# Patient Record
Sex: FEMALE | Race: OTHER | NOT HISPANIC OR LATINO
[De-identification: names, ages, dates, MRNs, and addresses within clinical notes are randomized per-mention and may not be internally consistent; named-entity substitution may affect disease eponyms.]

---

## 2022-02-14 ENCOUNTER — TRANSCRIPTION ENCOUNTER (OUTPATIENT)
Age: 52
End: 2022-02-14

## 2022-02-14 VITALS
SYSTOLIC BLOOD PRESSURE: 115 MMHG | RESPIRATION RATE: 16 BRPM | TEMPERATURE: 97 F | HEIGHT: 64 IN | DIASTOLIC BLOOD PRESSURE: 52 MMHG | HEART RATE: 71 BPM | WEIGHT: 150.58 LBS | OXYGEN SATURATION: 100 %

## 2022-02-14 NOTE — ASU PATIENT PROFILE, ADULT - FALL HARM RISK - HARM RISK INTERVENTIONS

## 2022-02-14 NOTE — ASU PATIENT PROFILE, ADULT - NSICDXPASTMEDICALHX_GEN_ALL_CORE_FT
PAST MEDICAL HISTORY:  Anxiety     Asthma resolved    Fibroid 2020    Food allergy     Hemangioma of liver     Kidney stone 2006     PAST MEDICAL HISTORY:  Anxiety     Asthma resolved    Fibroid 2020    Hemangioma of liver     Kidney stone 2006

## 2022-02-15 ENCOUNTER — RESULT REVIEW (OUTPATIENT)
Age: 52
End: 2022-02-15

## 2022-02-15 ENCOUNTER — OUTPATIENT (OUTPATIENT)
Dept: OUTPATIENT SERVICES | Facility: HOSPITAL | Age: 52
LOS: 1 days | Discharge: HOME | End: 2022-02-15
Payer: COMMERCIAL

## 2022-02-15 VITALS
HEART RATE: 68 BPM | SYSTOLIC BLOOD PRESSURE: 112 MMHG | OXYGEN SATURATION: 100 % | DIASTOLIC BLOOD PRESSURE: 57 MMHG | RESPIRATION RATE: 9 BRPM

## 2022-02-15 DIAGNOSIS — Z98.890 OTHER SPECIFIED POSTPROCEDURAL STATES: Chronic | ICD-10-CM

## 2022-02-15 PROCEDURE — 88304 TISSUE EXAM BY PATHOLOGIST: CPT | Mod: 26

## 2022-02-15 PROCEDURE — 21552 EXC NECK LES SC 3 CM/>: CPT

## 2022-02-15 PROCEDURE — 88304 TISSUE EXAM BY PATHOLOGIST: CPT

## 2022-02-15 RX ORDER — ERGOCALCIFEROL 1.25 MG/1
0 CAPSULE ORAL
Qty: 0 | Refills: 0 | DISCHARGE

## 2022-02-15 RX ORDER — ACETAMINOPHEN 500 MG
1000 TABLET ORAL ONCE
Refills: 0 | Status: DISCONTINUED | OUTPATIENT
Start: 2022-02-15 | End: 2022-02-15

## 2022-02-15 RX ORDER — OXYCODONE AND ACETAMINOPHEN 5; 325 MG/1; MG/1
1 TABLET ORAL ONCE
Refills: 0 | Status: DISCONTINUED | OUTPATIENT
Start: 2022-02-15 | End: 2022-02-15

## 2022-02-15 RX ORDER — MULTIVIT-MIN/FERROUS GLUCONATE 9 MG/15 ML
1 LIQUID (ML) ORAL
Qty: 0 | Refills: 0 | DISCHARGE

## 2022-02-15 RX ORDER — ONDANSETRON 8 MG/1
4 TABLET, FILM COATED ORAL ONCE
Refills: 0 | Status: DISCONTINUED | OUTPATIENT
Start: 2022-02-15 | End: 2022-02-15

## 2022-02-15 RX ORDER — EPINEPHRINE 0.3 MG/.3ML
0 INJECTION INTRAMUSCULAR; SUBCUTANEOUS
Qty: 0 | Refills: 0 | DISCHARGE

## 2022-02-15 RX ORDER — SODIUM CHLORIDE 9 MG/ML
1000 INJECTION, SOLUTION INTRAVENOUS
Refills: 0 | Status: DISCONTINUED | OUTPATIENT
Start: 2022-02-15 | End: 2022-02-15

## 2022-02-15 RX ORDER — BENZOCAINE AND MENTHOL 5; 1 G/100ML; G/100ML
1 LIQUID ORAL EVERY 4 HOURS
Refills: 0 | Status: DISCONTINUED | OUTPATIENT
Start: 2022-02-15 | End: 2022-02-15

## 2022-02-15 NOTE — ASU DISCHARGE PLAN (ADULT/PEDIATRIC) - CARE PROVIDER_API CALL
Osman Abarca)  Otolaryngology  [address] [place] [address], [address] [place] [address]  Phone: [phone]  Fax: [phone]  Follow Up Time:

## 2022-02-15 NOTE — ASU DISCHARGE PLAN (ADULT/PEDIATRIC) - NS MD DC FALL RISK RISK
For information on Fall & Injury Prevention, visit: [email or web address] OR  [email or web address] OR  https://www.cdc.gov/steadi/patient.html

## 2022-02-15 NOTE — ASU DISCHARGE PLAN (ADULT/PEDIATRIC) - ASU DC SPECIAL INSTRUCTIONSFT
Please  your medications from [facility] pharmacy prior to leaving [facility]  1. Percocet 5 mg by mouth as needed for pain every 4-6 hours  2. Bactrim DS by mouth twice a day (antibiotic)  --  Please call the office for a follow up appointment on Wednesday 2/23/2022  [phone]  [address] [place] [address]  --  If you have any questions do not hesitate to call the office or email me.  [email or web address]

## 2022-02-15 NOTE — PACU DISCHARGE NOTE - COMMENTS
Pt ready for d/c. Discharge instructions given, verbalized understanding. Incision dry and intact. Meds retrieved from [facility], Escorted with RN to lobby to meet her escort.

## 2022-02-24 LAB — SURGICAL PATHOLOGY STUDY: SIGNIFICANT CHANGE UP

## 2022-04-06 ENCOUNTER — EMERGENCY (EMERGENCY)
Facility: HOSPITAL | Age: 52
LOS: 1 days | Discharge: LEFT BEFORE DISCHARGE | End: 2022-04-06
Admitting: EMERGENCY MEDICINE
Payer: COMMERCIAL

## 2022-04-06 VITALS
HEART RATE: 71 BPM | HEIGHT: 64 IN | SYSTOLIC BLOOD PRESSURE: 125 MMHG | OXYGEN SATURATION: 98 % | WEIGHT: 145.06 LBS | RESPIRATION RATE: 18 BRPM | TEMPERATURE: 98 F | DIASTOLIC BLOOD PRESSURE: 82 MMHG

## 2022-04-06 DIAGNOSIS — M79.605 PAIN IN LEFT LEG: ICD-10-CM

## 2022-04-06 DIAGNOSIS — Z98.890 OTHER SPECIFIED POSTPROCEDURAL STATES: Chronic | ICD-10-CM

## 2022-04-06 DIAGNOSIS — Z53.21 PROCEDURE AND TREATMENT NOT CARRIED OUT DUE TO PATIENT LEAVING PRIOR TO BEING SEEN BY HEALTH CARE PROVIDER: ICD-10-CM

## 2022-04-06 PROBLEM — D18.03 HEMANGIOMA OF INTRA-ABDOMINAL STRUCTURES: Chronic | Status: ACTIVE | Noted: 2022-02-14

## 2022-04-06 PROBLEM — J45.909 UNSPECIFIED ASTHMA, UNCOMPLICATED: Chronic | Status: ACTIVE | Noted: 2022-02-14

## 2022-04-06 PROBLEM — N20.0 CALCULUS OF KIDNEY: Chronic | Status: ACTIVE | Noted: 2022-02-14

## 2022-04-06 PROBLEM — F41.9 ANXIETY DISORDER, UNSPECIFIED: Chronic | Status: ACTIVE | Noted: 2022-02-14

## 2022-04-06 PROBLEM — D21.9 BENIGN NEOPLASM OF CONNECTIVE AND OTHER SOFT TISSUE, UNSPECIFIED: Chronic | Status: ACTIVE | Noted: 2022-02-14

## 2022-04-06 PROBLEM — Z00.00 ENCOUNTER FOR PREVENTIVE HEALTH EXAMINATION: Status: ACTIVE | Noted: 2022-04-06

## 2022-04-06 PROCEDURE — L9991: CPT

## 2022-04-06 NOTE — ED ADULT NURSE NOTE - OBJECTIVE STATEMENT
Pt presents to ED complaining of calf pain for a week. Possibly exacerbated from working out per patient. Wants to rule out blood clot.

## 2022-04-07 ENCOUNTER — APPOINTMENT (OUTPATIENT)
Dept: ORTHOPEDIC SURGERY | Facility: CLINIC | Age: 52
End: 2022-04-07

## 2022-04-08 ENCOUNTER — APPOINTMENT (OUTPATIENT)
Dept: ORTHOPEDIC SURGERY | Facility: CLINIC | Age: 52
End: 2022-04-08
Payer: COMMERCIAL

## 2022-04-08 ENCOUNTER — APPOINTMENT (OUTPATIENT)
Age: 52
End: 2022-04-08

## 2022-04-08 ENCOUNTER — OUTPATIENT (OUTPATIENT)
Dept: OUTPATIENT SERVICES | Facility: HOSPITAL | Age: 52
LOS: 1 days | End: 2022-04-08
Payer: COMMERCIAL

## 2022-04-08 ENCOUNTER — RESULT REVIEW (OUTPATIENT)
Age: 52
End: 2022-04-08

## 2022-04-08 VITALS
SYSTOLIC BLOOD PRESSURE: 99 MMHG | DIASTOLIC BLOOD PRESSURE: 66 MMHG | WEIGHT: 145 LBS | HEIGHT: 65 IN | OXYGEN SATURATION: 98 % | BODY MASS INDEX: 24.16 KG/M2 | HEART RATE: 77 BPM

## 2022-04-08 DIAGNOSIS — S86.112A STRAIN OF OTHER MUSCLE(S) AND TENDON(S) OF POSTERIOR MUSCLE GROUP AT LOWER LEG LEVEL, LEFT LEG, INITIAL ENCOUNTER: ICD-10-CM

## 2022-04-08 DIAGNOSIS — M79.662 PAIN IN LEFT LOWER LEG: ICD-10-CM

## 2022-04-08 DIAGNOSIS — Z98.890 OTHER SPECIFIED POSTPROCEDURAL STATES: Chronic | ICD-10-CM

## 2022-04-08 DIAGNOSIS — Z78.9 OTHER SPECIFIED HEALTH STATUS: ICD-10-CM

## 2022-04-08 DIAGNOSIS — Z87.442 PERSONAL HISTORY OF URINARY CALCULI: ICD-10-CM

## 2022-04-08 DIAGNOSIS — Z82.49 FAMILY HISTORY OF ISCHEMIC HEART DISEASE AND OTHER DISEASES OF THE CIRCULATORY SYSTEM: ICD-10-CM

## 2022-04-08 DIAGNOSIS — Z82.3 FAMILY HISTORY OF STROKE: ICD-10-CM

## 2022-04-08 DIAGNOSIS — Z87.09 PERSONAL HISTORY OF OTHER DISEASES OF THE RESPIRATORY SYSTEM: ICD-10-CM

## 2022-04-08 DIAGNOSIS — Z80.42 FAMILY HISTORY OF MALIGNANT NEOPLASM OF PROSTATE: ICD-10-CM

## 2022-04-08 DIAGNOSIS — Z82.61 FAMILY HISTORY OF ARTHRITIS: ICD-10-CM

## 2022-04-08 PROCEDURE — 93971 EXTREMITY STUDY: CPT

## 2022-04-08 PROCEDURE — 73590 X-RAY EXAM OF LOWER LEG: CPT | Mod: 26,LT

## 2022-04-08 PROCEDURE — 73590 X-RAY EXAM OF LOWER LEG: CPT

## 2022-04-08 PROCEDURE — 93971 EXTREMITY STUDY: CPT | Mod: 26,LT

## 2022-04-08 PROCEDURE — 99203 OFFICE O/P NEW LOW 30 MIN: CPT

## 2022-04-08 NOTE — PHYSICAL EXAM
[de-identified] : General: Well-nourished, well-developed, alert, and in no acute distress.\par Head: Normocephalic.\par Eyes: Pupils equal round reactive to light and accommodation, extraocular muscles intact, normal sclera.\par Nose: No nasal discharge.\par Cardiac: Regular rate. Extremities are warm and well perfused. Distal pulses are symmetric bilaterally.\par Respiratory: No labored breathing.\par Extremities: Sensation is intact distally bilaterally.  Distal pulses are symmetric bilaterally\par Lymphatic: No regional lymphadenopathy, no lymphedema\par Neurologic: No focal deficits\par Skin: Normal skin color, texture, and turgor\par Psychiatric: Normal affect\par MSK: as noted above/below\par \par \par \par RIGHT LEG:\par \par Inspection: no bruising, swelling or rash, CIRCUMFERENCE AT MID LEG 13CM\par Bony Prominence: none\par Range of Motion: Knee flexion 130 degrees, knee extension 0 degrees\par Palpation: no tenderness posterior medial tibia, anterior tibial cortex,  no calf pain\par Leg length discrepancy: no\par Distal Pulses: Intact\par Lower extremity strength: Knee Extension 5/5 with no pain, Knee Flexion 5/5 with no pain, Hip Flexion 5/5 with no pain, Hip Abduction 5/5 with no pain, PlantarFlexion 5/5, Dorsiflexion 5/5, Eversion 5/5, Inversion 5/5  \par Lower extremity reflexes: 2 Plus \par Lower extremity sensation: Intact\par \par Special Test:\par Ade Sign: Negative\par Hooks Test: Negative\par \par LEFT LEG:\par \par Inspection: no bruising, swelling or rash, CIRCUMFERENCE AT MID LEG 13CM\par Bony Prominence: none\par Range of Motion: Knee flexion 130 degrees, knee extension 0 degrees\par Palpation: PAIN AT PROXIMAL MIDSUBSTANCE MEDIAL GASTROC, no tenderness posterior medial tibia, anterior tibial cortex,  \par Leg length discrepancy: no\par Distal Pulses: Intact\par Lower extremity strength: Knee Extension 5/5 with no pain, Knee Flexion 5/5 with no pain, Hip Flexion 5/5 with no pain, Hip Abduction 5/5 with no pain, PlantarFlexion 5-/5, Dorsiflexion 5/5, Eversion 5/5, Inversion 5/5 \par Lower extremity reflexes: 2 Plus \par Lower extremity sensation: Intact\par \par Special Test:\par Ade Sign: POSITIVE\par Hooks Test: Negative\par MILD PAIN WITH TOE RISE [de-identified] : EXAM:  XR TIB FIB 2 VIEWS LT\par \par PROCEDURE DATE:  04/08/2022\par \par \par \par INTERPRETATION:  Clinical history/reason for exam: Pain.\par \par 2 views.\par \par Findings/\par impression: No acute fracture, dislocation or destructive lesion.. Medial tibial condylar ossicle.\par

## 2022-04-08 NOTE — DISCUSSION/SUMMARY
[Medication Risks Reviewed] : Medication risks reviewed [de-identified] : The patient is a 51 year old woman presenting with a 2 week history of acute, atraumatic left medial calf pain.  Her leg circumference is increased by 1cm compared to contralateral side.  Will rule out DVT.  If negative, suspect Grade 1-2 medial head gastrocnemius strain.  Ddx also includes chronic exertional compartment syndrome.\par \par Imaging/Diagnostics/Medical Records were interpreted and/or reviewed and results were reviewed with the patient in detail.  All questions were answered appropriately.\par \par The patient was counseled on the natural progression of the problem(s) today and potential complications of diagnoses.  The patient was provided reassurance today.\par \par LLE Venous Duplex ordered today.\par \par Restriction from kickiboxing, high intensity exercise.\par \par Compression, elevation, warm compress.\par \par Avoid massage.\par \par Gentle ROM exercises.\par \par Pending results, discussed trial of PT.\par \par ------------------------------------------------------------------------------------------------------------------\par Patient appreciates and agrees with current plan.\par \par The patient's diagnosis above was evaluated by me, personally.  Diagnostic Testing and treatment options were discussed with the patient in detail.  The risks/benefits/potential complications of diagnostic testing/treatments were described in detail.  \par \par This note was generated using a mixture of manual typing and dragon medical dictation software.  A reasonable effort has been made for proofreading its contents, but typos may still remain.  If there are any questions or points of clarification needed please notify my office.\par \par \par >30 minutes of time was spent on total encounter.  >50% of the visit was spent on face-to-face counseling/coordination of care and medical-decision making for this patient.\par \par

## 2022-04-08 NOTE — HISTORY OF PRESENT ILLNESS
[de-identified] : The patient is a 51 year old woman presenting with left calf pain.\par \par She presents with a 2 week history of acute, atraumatic left proximal medial calf pain.  She states that she started back training kickboxing, and has been having progression of pain at her calf with activity.  She feels occasional dull pain at rest as well.  No known history or VTE.  No recent travel.  No use of OCPs.  She states that she had elective neck lipoma resection 2 months ago, and had general anesthesia, but otherwise normal perioperative course.  She has history of endometriosis, with symptoms controlled with turmeric vitamin supplements.  She's noticed mild calf swelling.\par \par Pain is rated 5/10, described as stabbing, improved with rest, worse with walking.

## 2022-04-11 RX ORDER — TERBINAFINE HYDROCHLORIDE 250 MG/1
250 TABLET ORAL
Qty: 28 | Refills: 0 | Status: ACTIVE | COMMUNITY
Start: 2022-01-10

## 2022-04-11 RX ORDER — COVID-19 MOLECULAR TEST ASSAY
KIT MISCELLANEOUS
Qty: 1 | Refills: 0 | Status: ACTIVE | COMMUNITY
Start: 2021-12-21

## 2024-03-20 ENCOUNTER — APPOINTMENT (OUTPATIENT)
Dept: OBGYN | Facility: CLINIC | Age: 54
End: 2024-03-20

## 2024-09-11 NOTE — ASU PATIENT PROFILE, ADULT - BRADEN SCORE (IF 18 OR LESS ACTIVATE SKIN INJURY RISK INCREASED GUIDELINE), MLM
What Type Of Note Output Would You Prefer (Optional)?: Standard Output
Hpi Title: Evaluation of Skin Lesions
21

## (undated) DEVICE — STAPLER SKIN PROXIMATE

## (undated) DEVICE — PACK UPPER BODY

## (undated) DEVICE — DRAPE SURGICAL #1010

## (undated) DEVICE — SUT SILK 5-0 18" P-3

## (undated) DEVICE — ELCTR MONOPOLAR STIMULATOR PROBE FLUSH-TIP

## (undated) DEVICE — POLISHER OR CAUTERY TIP

## (undated) DEVICE — FOLEY TRAY 16FR 5CC LF UMETER CLOSED

## (undated) DEVICE — DRAPE BACK TABLE COVER 80X90"

## (undated) DEVICE — DRSG TEGADERM 4X4.75"

## (undated) DEVICE — DRSG MASTISOL

## (undated) DEVICE — DRSG TEGADERM 2.5X3"

## (undated) DEVICE — RUBBERBAND STRL LTX FR 200/CA 3X1/8IN